# Patient Record
(demographics unavailable — no encounter records)

---

## 2024-10-16 NOTE — REASON FOR VISIT
[Follow-Up Visit] : a follow-up visit for [Morbid Obesity (BMI>40)] : morbid obesity (bmi>40) [Home] : at home, [unfilled] , at the time of the visit. [Other Location: e.g. Home (Enter Location, City,State)___] : at [unfilled] [Mother] : mother [Patient] : the patient [Referring By:  ___] : ~Cyril Ln~ was referred for psychological evaluation by Dr. FONSECA [de-identified] : for evaluation and tx of psyc sxs related to obesity [de-identified] : Confidentiality and its limitations were explained.

## 2024-10-16 NOTE — DISCUSSION/SUMMARY
[Behavior plan developed] : Behavior plan developed [Addtnl Health & Behavior Intervention: Individual] : Additional Health and Behavior Intervention: Individual [Cognitive Behavioral psychotherapy to remove barriers to self-care] : Cognitive Behavioral psychotherapy to remove barriers to self-care. [Conventional grooming and hygiene] : Conventional grooming and hygiene [Calm, cooperative] : Calm, cooperative [No unusual behaviors, movements, etc.] : No unusual behaviors, movements, etc. 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  She has a hx of early childhood feeding disorder and reportedly has always had a very limited diet.  She also endorses monthly binge eating episodes.  Treatment will focus on ARFID and expanding food variety. [de-identified] : ARFID OSFED, BED  Per M, pt also diagnosed with: ASD ADHD, Unspec Unspec Anx Disorder [FreeTextEntry3] : Follow up x 2 weeks.  Call the office to schedule.  Pt is available Wed and Fri but sees her another therapist on Weds and writer is unavailable on Fridays. [de-identified] : send CBT-ARFID food list to the office

## 2024-10-16 NOTE — HISTORY OF PRESENT ILLNESS
[Large Portions] : large portions [Emotional Eating] : emotional eating [Decrease Activity] : decrease activity [Mindless Eating] : mindless eating [Quantity Eating] : quantity eating [Poor Choices] : poor choices [Secretive Eating] : secretive eating  [de-identified] : Pt's motivation for weight loss is to "feel better in my body", be more active and to "look better in certain clothes".   Per chart review, her highest weight was 263 lbs earlier this year and her lowest weight was 229 lbs in 2019. Her stated goal weight is under 200 lbs. [de-identified] : skipping meals and eating one large meal a day [de-identified] : certain foods (cucumbers, zucchini and avocado) lead to gagging and involuntary vomiting which she attributes to the texture. Had a feeding therapist beginning at 9 mths old through 5 yrs old due to difficulties chewing and swallowing.  Started solids at 3 yrs old.  Eats pasta, pizza, chicken nuggets, fish sticks, mac n cheese, chicken parm, fries, goldfish, cheetos, yogurt drink, Gatorade and Snapple and previously yogurt w/granola.   Recently started eating mixed veggies.   Will eat peeled apples and grapes with encouragement.    Also endorses eating large portions, eating rapidly, eating alone due to shame. Eating with lack of control until uncomfortably full once a month.     [de-identified] : At intake, a typical day of eating was reported as follows: B:  skips or cereal w/milk and bagel w/cc L:  eats at work (if not working, sleeps in) -  yesterday, meat, cheese, rice and lettuce; eats their food as well as her food from home S:  smart pop, gold fish, microwave popcorn D:  pasta and chicken tenders S: chips, popcorn Drinks water, Snapple and Gatorade.  Occasionally apple juice or a glass of milk.  Suggested she decrease her caloric drinks.

## 2024-10-16 NOTE — SOCIAL HISTORY
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

## 2024-10-16 NOTE — PHYSICAL EXAM
[Normal] : normal [Fair] : fair [AH] : no auditory hallucinations [VH] : no visual hallucinations [Suicidal Ideation] : no suicidal ideation [Homicidal Ideation] : no homicidal ideation [FreeTextEntry8] : "average" [de-identified] : easily distracted at times

## 2024-10-16 NOTE — CURRENT PSYCHIATRIC SYMPTOMS
[Depressed Mood] : no depressed mood [Decreased Concentration] : no decrease in concentrating ability [Insomnia] : no insomnia disorder [Euphoria] : no euphoria [Dec Need For Sleep] : no decreased need for sleep [Thought Disorder] : ~T a thought disorder was not noted [de-identified] : reports feeling anxious in reaction to loud noises or things people say to her - endorses panic attacks approx 1x/month [FreeTextEntry1] : Misa has been in treatment for individual therapy with Genaro Acosta at [facility] x 2 yrs with focus on ASD and anxiety.  Has been in therapy since early childhood.  Was prescribed medication for ADHD briefly during HS but had side effects.  Not currently prescribed psyc meds.

## 2024-11-06 NOTE — PHYSICAL EXAM
[Normal] : normal [Fair] : fair [AH] : no auditory hallucinations [VH] : no visual hallucinations [Suicidal Ideation] : no suicidal ideation [Homicidal Ideation] : no homicidal ideation [FreeTextEntry8] : "average" [de-identified] : easily distracted at times

## 2024-11-06 NOTE — REASON FOR VISIT
[Follow-Up Visit] : a follow-up visit for [Morbid Obesity (BMI>40)] : morbid obesity (bmi>40) [Home] : at home, [unfilled] , at the time of the visit. [Other Location: e.g. Home (Enter Location, City,State)___] : at [unfilled] [Mother] : mother [Patient] : the patient [Referring By:  ___] : ~Cyril Ln~ was referred for psychological evaluation by Dr. FONSECA [de-identified] : for evaluation and tx of psyc sxs related to obesity [de-identified] : Confidentiality and its limitations were explained.

## 2024-11-06 NOTE — CURRENT PSYCHIATRIC SYMPTOMS
[Depressed Mood] : no depressed mood [Decreased Concentration] : no decrease in concentrating ability [Insomnia] : no insomnia disorder [Euphoria] : no euphoria [Dec Need For Sleep] : no decreased need for sleep [Thought Disorder] : ~T a thought disorder was not noted [de-identified] : reports feeling anxious in reaction to loud noises or things people say to her - endorses panic attacks approx 1x/month [FreeTextEntry1] : Misa has been in treatment for individual therapy with Genaro Acosta at [facility] x 2 yrs with focus on ASD and anxiety.  Has been in therapy since early childhood.  Was prescribed medication for ADHD briefly during HS but had side effects.  Not currently prescribed psyc meds.

## 2024-11-06 NOTE — DISCUSSION/SUMMARY
[Conventional grooming and hygiene] : Conventional grooming and hygiene [Calm, cooperative] : Calm, cooperative [No unusual behaviors, movements, etc.] : No unusual behaviors, movements, etc. [Normal Rate/Tone/Volume] : Normal Rate/Tone/Volume [Normal Range] : Normal Range [Euthymic] : Euthymic [Logical, Goal Directed] : Logical, Goal Directed [Behavior plan developed] : Behavior plan developed [Addtnl Health & Behavior Intervention: Individual] : Additional Health and Behavior Intervention: Individual [Cognitive Behavioral psychotherapy to remove barriers to self-care] : Cognitive Behavioral psychotherapy to remove barriers to self-care. 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  She has a hx of early childhood feeding disorder and reportedly has always had a very limited diet.  She also endorses monthly binge eating episodes.  Treatment will focus on ARFID and expanding food variety. [de-identified] : ARFID OSFED, BED  Per M, pt also diagnosed with: ASD ADHD, Unspec Unspec Anx Disorder [FreeTextEntry3] : Follow up x 1 mth.   [de-identified] : send CBT-ARFID food list to the office self monitor food intake

## 2024-11-06 NOTE — HISTORY OF PRESENT ILLNESS
[Large Portions] : large portions [Emotional Eating] : emotional eating [Decrease Activity] : decrease activity [Mindless Eating] : mindless eating [Quantity Eating] : quantity eating [Poor Choices] : poor choices [Secretive Eating] : secretive eating  [de-identified] : Pt's motivation for weight loss is to "feel better in my body", be more active and to "look better in certain clothes".   Per chart review, her highest weight was 263 lbs earlier this year and her lowest weight was 229 lbs in 2019. Her stated goal weight is under 200 lbs. [de-identified] : skipping meals and eating one large meal a day [de-identified] : certain foods (cucumbers, zucchini and avocado) lead to gagging and involuntary vomiting which she attributes to the texture. Had a feeding therapist beginning at 9 mths old through 5 yrs old due to difficulties chewing and swallowing.  Started solids at 3 yrs old.  Eats pasta, pizza, chicken nuggets, fish sticks, mac n cheese, chicken parm, fries, goldfish, cheetos, yogurt drink, Gatorade and Snapple and previously yogurt w/granola.   Recently started eating mixed veggies.   Will eat peeled apples and grapes with encouragement.    Also endorses eating large portions, eating rapidly, eating alone due to shame. Eating with lack of control until uncomfortably full once a month.     [de-identified] : At intake, a typical day of eating was reported as follows: B:  skips or cereal w/milk and bagel w/cc L:  eats at work (if not working, sleeps in) -  yesterday, meat, cheese, rice and lettuce; eats their food as well as her food from home S:  smart pop, gold fish, microwave popcorn D:  pasta and chicken tenders S: chips, popcorn Drinks water, Snapple and Gatorade.  Occasionally apple juice or a glass of milk.  Suggested she decrease her caloric drinks.

## 2024-12-11 NOTE — DISCUSSION/SUMMARY
[Conventional grooming and hygiene] : Conventional grooming and hygiene [Calm, cooperative] : Calm, cooperative [Normal Rate/Tone/Volume] : Normal Rate/Tone/Volume [Logical, Goal Directed] : Logical, Goal Directed [Behavior plan developed] : Behavior plan developed [Addtnl Health & Behavior Intervention: Individual] : Additional Health and Behavior Intervention: Individual [Cognitive Behavioral psychotherapy to remove barriers to self-care] : Cognitive Behavioral psychotherapy to remove barriers to self-care. 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  She has a hx of early childhood feeding disorder and reportedly has always had a very limited diet.  She also endorses monthly binge eating episodes.  Treatment will focus on ARFID and expanding food variety. [de-identified] : ARFID OSFED, BED  Per M, pt also diagnosed with: ASD ADHD, Unspec Unspec Anx Disorder [FreeTextEntry3] : Follow up x 1 mth.   [de-identified] : send CBT-ARFID food list to the office self monitor food intake

## 2024-12-11 NOTE — HISTORY OF PRESENT ILLNESS
[Large Portions] : large portions [Emotional Eating] : emotional eating [Decrease Activity] : decrease activity [Mindless Eating] : mindless eating [Quantity Eating] : quantity eating [Poor Choices] : poor choices [Secretive Eating] : secretive eating  [de-identified] : Pt's motivation for weight loss is to "feel better in my body", be more active and to "look better in certain clothes".   Per chart review, her highest weight was 263 lbs earlier this year and her lowest weight was 229 lbs in 2019. Her stated goal weight is under 200 lbs. [de-identified] : skipping meals and eating one large meal a day [de-identified] : certain foods (cucumbers, zucchini and avocado) lead to gagging and involuntary vomiting which she attributes to the texture. Had a feeding therapist beginning at 9 mths old through 5 yrs old due to difficulties chewing and swallowing.  Started solids at 3 yrs old.  Eats pasta, pizza, chicken nuggets, fish sticks, mac n cheese, chicken parm, fries, goldfish, cheetos, yogurt drink, Gatorade and Snapple and previously yogurt w/granola.   Recently started eating mixed veggies.   Will eat peeled apples and grapes with encouragement.    Also endorses eating large portions, eating rapidly, eating alone due to shame. Eating with lack of control until uncomfortably full once a month.     [de-identified] : At intake, a typical day of eating was reported as follows: B:  skips or cereal w/milk and bagel w/cc L:  eats at work (if not working, sleeps in) -  yesterday, meat, cheese, rice and lettuce; eats their food as well as her food from home S:  smart pop, gold fish, microwave popcorn D:  pasta and chicken tenders S: chips, popcorn Drinks water, Snapple and Gatorade.  Occasionally apple juice or a glass of milk.  Suggested she decrease her caloric drinks.

## 2024-12-11 NOTE — CURRENT PSYCHIATRIC SYMPTOMS
[Depressed Mood] : no depressed mood [Decreased Concentration] : no decrease in concentrating ability [Insomnia] : no insomnia disorder [Euphoria] : no euphoria [Dec Need For Sleep] : no decreased need for sleep [Thought Disorder] : ~T a thought disorder was not noted [de-identified] : reports feeling anxious in reaction to loud noises or things people say to her - endorses panic attacks approx 1x/month [FreeTextEntry1] : Misa has been in treatment for individual therapy with Genaro Acosta at [facility] x 2 yrs with focus on ASD and anxiety.  Has been in therapy since early childhood.  Was prescribed medication for ADHD briefly during HS but had side effects.  Not currently prescribed psyc meds.

## 2024-12-11 NOTE — REASON FOR VISIT
[Follow-Up Visit] : a follow-up visit for [Morbid Obesity (BMI>40)] : morbid obesity (bmi>40) [Home] : at home, [unfilled] , at the time of the visit. [Other Location: e.g. Home (Enter Location, City,State)___] : at [unfilled] [Mother] : mother [Patient] : the patient [Referring By:  ___] : ~Cyril Ln~ was referred for psychological evaluation by Dr. FONSECA [de-identified] : for evaluation and tx of psyc sxs related to obesity [de-identified] : Confidentiality and its limitations were explained.

## 2024-12-11 NOTE — PHYSICAL EXAM
[Normal] : normal [Fair] : fair [AH] : no auditory hallucinations [VH] : no visual hallucinations [Suicidal Ideation] : no suicidal ideation [Homicidal Ideation] : no homicidal ideation [FreeTextEntry8] : "average" [de-identified] : easily distracted at times

## 2025-04-30 NOTE — CURRENT PSYCHIATRIC SYMPTOMS
[Depressed Mood] : no depressed mood [Decreased Concentration] : no decrease in concentrating ability [Insomnia] : no insomnia disorder [Euphoria] : no euphoria [Dec Need For Sleep] : no decreased need for sleep [Thought Disorder] : ~T a thought disorder was not noted [de-identified] : reports feeling anxious in reaction to loud noises or things people say to her - endorses panic attacks approx 1x/month [FreeTextEntry1] : Misa has been in treatment for individual therapy with Genaro Acosta at [facility] x 2 yrs with focus on ASD and anxiety.  Has been in therapy since early childhood.  Was prescribed medication for ADHD briefly during HS but had side effects.  Not currently prescribed psyc meds.

## 2025-04-30 NOTE — PHYSICAL EXAM
[Normal] : normal [Fair] : fair [AH] : no auditory hallucinations [VH] : no visual hallucinations [Suicidal Ideation] : no suicidal ideation [Homicidal Ideation] : no homicidal ideation [FreeTextEntry8] : "average" [de-identified] : easily distracted at times

## 2025-04-30 NOTE — HISTORY OF PRESENT ILLNESS
[Large Portions] : large portions [Emotional Eating] : emotional eating [Decrease Activity] : decrease activity [Mindless Eating] : mindless eating [Quantity Eating] : quantity eating [Poor Choices] : poor choices [Secretive Eating] : secretive eating  [de-identified] : skipping meals and eating one large meal a day [de-identified] : certain foods (cucumbers, zucchini and avocado) lead to gagging and involuntary vomiting which she attributes to the texture. Had a feeding therapist beginning at 9 mths old through 5 yrs old due to difficulties chewing and swallowing.  Started solids at 3 yrs old.  Eats pasta, pizza, chicken nuggets, fish sticks, mac n cheese, chicken parm, fries, goldfish, cheetos, yogurt drink, Gatorade and Snapple and previously yogurt w/granola.   Recently started eating mixed veggies.   Will eat peeled apples and grapes with encouragement.    Also endorses eating large portions, eating rapidly, eating alone due to shame. Eating with lack of control until uncomfortably full once a month.     [de-identified] : At intake, a typical day of eating was reported as follows: B:  skips or cereal w/milk and bagel w/cc L:  eats at work (if not working, sleeps in) -  yesterday, meat, cheese, rice and lettuce; eats their food as well as her food from home S:  smart pop, gold fish, microwave popcorn D:  pasta and chicken tenders S: chips, popcorn Drinks water, Snapple and Gatorade.  Occasionally apple juice or a glass of milk.  Suggested she decrease her caloric drinks. [de-identified] : Pt's motivation for weight loss is to "feel better in my body", be more active and to "look better in certain clothes".   Per chart review, her highest weight was 263 lbs earlier this year and her lowest weight was 229 lbs in 2019. Her stated goal weight is under 200 lbs.

## 2025-04-30 NOTE — HISTORY OF PRESENT ILLNESS
[Large Portions] : large portions [Emotional Eating] : emotional eating [Decrease Activity] : decrease activity [Mindless Eating] : mindless eating [Quantity Eating] : quantity eating [Poor Choices] : poor choices [Secretive Eating] : secretive eating  [de-identified] : skipping meals and eating one large meal a day [de-identified] : certain foods (cucumbers, zucchini and avocado) lead to gagging and involuntary vomiting which she attributes to the texture. Had a feeding therapist beginning at 9 mths old through 5 yrs old due to difficulties chewing and swallowing.  Started solids at 3 yrs old.  Eats pasta, pizza, chicken nuggets, fish sticks, mac n cheese, chicken parm, fries, goldfish, cheetos, yogurt drink, Gatorade and Snapple and previously yogurt w/granola.   Recently started eating mixed veggies.   Will eat peeled apples and grapes with encouragement.    Also endorses eating large portions, eating rapidly, eating alone due to shame. Eating with lack of control until uncomfortably full once a month.     [de-identified] : At intake, a typical day of eating was reported as follows: B:  skips or cereal w/milk and bagel w/cc L:  eats at work (if not working, sleeps in) -  yesterday, meat, cheese, rice and lettuce; eats their food as well as her food from home S:  smart pop, gold fish, microwave popcorn D:  pasta and chicken tenders S: chips, popcorn Drinks water, Snapple and Gatorade.  Occasionally apple juice or a glass of milk.  Suggested she decrease her caloric drinks. [de-identified] : Pt's motivation for weight loss is to "feel better in my body", be more active and to "look better in certain clothes".   Per chart review, her highest weight was 263 lbs earlier this year and her lowest weight was 229 lbs in 2019. Her stated goal weight is under 200 lbs.

## 2025-04-30 NOTE — DISCUSSION/SUMMARY
[Conventional grooming and hygiene] : Conventional grooming and hygiene [Calm, cooperative] : Calm, cooperative [Normal Rate/Tone/Volume] : Normal Rate/Tone/Volume [Logical, Goal Directed] : Logical, Goal Directed [Behavior plan developed] : Behavior plan developed [Addtnl Health & Behavior Intervention: Individual] : Additional Health and Behavior Intervention: Individual [Cognitive Behavioral psychotherapy to remove barriers to self-care] : Cognitive Behavioral psychotherapy to remove barriers to self-care. 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  She has a hx of early childhood feeding disorder and reportedly has always had a very limited diet.  She also endorses monthly binge eating episodes.  Treatment will focus on ARFID and expanding food variety. [de-identified] : ARFID OSFED, BED, In Remission on Zepbound  Per M, pt also diagnosed with: ASD ADHD, Unspec Unspec Anx Disorder [FreeTextEntry3] : Follow up x 1 mth.   [de-identified] : consume B daily gradually incorporate low fat, nutritious foods

## 2025-04-30 NOTE — CURRENT PSYCHIATRIC SYMPTOMS
[Depressed Mood] : no depressed mood [Decreased Concentration] : no decrease in concentrating ability [Insomnia] : no insomnia disorder [Euphoria] : no euphoria [Dec Need For Sleep] : no decreased need for sleep [Thought Disorder] : ~T a thought disorder was not noted [de-identified] : reports feeling anxious in reaction to loud noises or things people say to her - endorses panic attacks approx 1x/month [FreeTextEntry1] : Misa has been in treatment for individual therapy with Genaro Acosta at [facility] x 2 yrs with focus on ASD and anxiety.  Has been in therapy since early childhood.  Was prescribed medication for ADHD briefly during HS but had side effects.  Not currently prescribed psyc meds.

## 2025-04-30 NOTE — PHYSICAL EXAM
[Normal] : normal [Fair] : fair [AH] : no auditory hallucinations [VH] : no visual hallucinations [Suicidal Ideation] : no suicidal ideation [Homicidal Ideation] : no homicidal ideation [FreeTextEntry8] : "average" [de-identified] : easily distracted at times

## 2025-04-30 NOTE — DISCUSSION/SUMMARY
[Conventional grooming and hygiene] : Conventional grooming and hygiene [Calm, cooperative] : Calm, cooperative [Normal Rate/Tone/Volume] : Normal Rate/Tone/Volume [Logical, Goal Directed] : Logical, Goal Directed [Behavior plan developed] : Behavior plan developed [Addtnl Health & Behavior Intervention: Individual] : Additional Health and Behavior Intervention: Individual [Cognitive Behavioral psychotherapy to remove barriers to self-care] : Cognitive Behavioral psychotherapy to remove barriers to self-care. 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  She has a hx of early childhood feeding disorder and reportedly has always had a very limited diet.  She also endorses monthly binge eating episodes.  Treatment will focus on ARFID and expanding food variety. [de-identified] : ARFID OSFED, BED, In Remission on Zepbound  Per M, pt also diagnosed with: ASD ADHD, Unspec Unspec Anx Disorder [FreeTextEntry3] : Follow up x 1 mth.   [de-identified] : consume B daily gradually incorporate low fat, nutritious foods

## 2025-04-30 NOTE — REASON FOR VISIT
[Follow-Up Visit] : a follow-up visit for [Morbid Obesity (BMI>40)] : morbid obesity (bmi>40) [Home] : at home, [unfilled] , at the time of the visit. [Other Location: e.g. Home (Enter Location, City,State)___] : at [unfilled] [Mother] : mother [Patient] : the patient [Referring By:  ___] : ~Cyril Ln~ was referred for psychological evaluation by Dr. FONSECA [de-identified] : for evaluation and tx of psyc sxs related to obesity [de-identified] : Confidentiality and its limitations were explained.

## 2025-04-30 NOTE — REASON FOR VISIT
[Follow-Up Visit] : a follow-up visit for [Morbid Obesity (BMI>40)] : morbid obesity (bmi>40) [Home] : at home, [unfilled] , at the time of the visit. [Other Location: e.g. Home (Enter Location, City,State)___] : at [unfilled] [Mother] : mother [Patient] : the patient [Referring By:  ___] : ~Cyril Ln~ was referred for psychological evaluation by Dr. FONSECA [de-identified] : for evaluation and tx of psyc sxs related to obesity [de-identified] : Confidentiality and its limitations were explained.